# Patient Record
Sex: FEMALE
[De-identification: names, ages, dates, MRNs, and addresses within clinical notes are randomized per-mention and may not be internally consistent; named-entity substitution may affect disease eponyms.]

---

## 2021-12-03 ENCOUNTER — DASHBOARD ENCOUNTERS (OUTPATIENT)
Age: 56
End: 2021-12-03

## 2021-12-03 ENCOUNTER — LAB OUTSIDE AN ENCOUNTER (OUTPATIENT)
Dept: URBAN - METROPOLITAN AREA CLINIC 113 | Facility: CLINIC | Age: 56
End: 2021-12-03

## 2021-12-03 ENCOUNTER — WEB ENCOUNTER (OUTPATIENT)
Dept: URBAN - METROPOLITAN AREA CLINIC 113 | Facility: CLINIC | Age: 56
End: 2021-12-03

## 2021-12-03 ENCOUNTER — OFFICE VISIT (OUTPATIENT)
Dept: URBAN - METROPOLITAN AREA CLINIC 113 | Facility: CLINIC | Age: 56
End: 2021-12-03
Payer: SELF-PAY

## 2021-12-03 VITALS
WEIGHT: 166 LBS | HEIGHT: 66 IN | HEART RATE: 79 BPM | BODY MASS INDEX: 26.68 KG/M2 | RESPIRATION RATE: 20 BRPM | DIASTOLIC BLOOD PRESSURE: 89 MMHG | SYSTOLIC BLOOD PRESSURE: 127 MMHG | TEMPERATURE: 97.2 F

## 2021-12-03 DIAGNOSIS — R93.5 ABNORMAL CT OF THE ABDOMEN: ICD-10-CM

## 2021-12-03 DIAGNOSIS — R16.0 LIVER MASS: ICD-10-CM

## 2021-12-03 DIAGNOSIS — R10.13 EPIGASTRIC PAIN: ICD-10-CM

## 2021-12-03 DIAGNOSIS — R11.0 NAUSEA: ICD-10-CM

## 2021-12-03 DIAGNOSIS — R79.89 ELEVATED LFTS: ICD-10-CM

## 2021-12-03 DIAGNOSIS — R63.4 WEIGHT LOSS: ICD-10-CM

## 2021-12-03 DIAGNOSIS — Z12.11 COLON CANCER SCREENING: ICD-10-CM

## 2021-12-03 PROCEDURE — 99204 OFFICE O/P NEW MOD 45 MIN: CPT | Performed by: NURSE PRACTITIONER

## 2021-12-03 RX ORDER — INFLUENZA VIRUS VACCINE 15; 15; 15; 15 UG/.5ML; UG/.5ML; UG/.5ML; UG/.5ML
AS DIRECTED SUSPENSION INTRAMUSCULAR ONCE DAILY
Status: ACTIVE | COMMUNITY

## 2021-12-03 RX ORDER — UBIDECARENONE 30 MG
AS DIRECTED CAPSULE ORAL
Status: ACTIVE | COMMUNITY

## 2021-12-03 RX ORDER — AMLODIPINE BESYLATE 10 MG/1
1 TABLET TABLET ORAL ONCE A DAY
Status: ACTIVE | COMMUNITY

## 2021-12-03 RX ORDER — POLYETHYLENE GLYCOL 3350, SODIUM CHLORIDE, SODIUM BICARBONATE, POTASSIUM CHLORIDE 420; 11.2; 5.72; 1.48 G/4L; G/4L; G/4L; G/4L
420 G POWDER, FOR SOLUTION ORAL ONCE
Qty: 420 G | Refills: 0 | OUTPATIENT
Start: 2021-12-03 | End: 2021-12-04

## 2021-12-03 RX ORDER — ATORVASTATIN CALCIUM 40 MG/1
1 TABLET TABLET, FILM COATED ORAL ONCE A DAY
Status: ACTIVE | COMMUNITY

## 2021-12-03 RX ORDER — OMEPRAZOLE 20 MG/1
1 CAPSULE 30 MINUTES BEFORE MORNING MEAL CAPSULE, DELAYED RELEASE ORAL ONCE A DAY
Status: ACTIVE | COMMUNITY

## 2021-12-03 NOTE — HPI-TODAY'S VISIT:
55 yo woman presenting  evaluation of abdominal pain, weight loss, and liver masses noted on a CT a/p performed in the ED.  She is a terrible historian. She reports a history of liver masses, diagnosed a year ago. She provides some ED discharge paperwork from [facility] [place] that shows a contrasted CT scan was performed, showing several hemangiomas and liver cysts, as well as 2 indeterminate lesions in segment 4A measuring 25 and 35 mm which are more prominent than on the prior study but may represent areas focal fatty sparing in the background of hepatic steatosis. She also provides ED records from [facility] where labs show a normal CBC, AST 65, ALT 49, , and Tbili 0.7. She is very concerned about her liver. She tells me that her father  from "stomach cancer", as well as multiple aunts and uncles. She is unsure if this was truly stomach cancer because when asked, she also admits that it may have been a colon cancer.  She sometimes has trouble with swallowing characterized as a sensation of "something clogging her throat." This is intermittent with swallowing. She takes over the counter Pepcid with some relief. There is a history of heartburn type symptom, but not in a good while. There is sometimes nausea and vomiting. Nausea is usually associated with dizziness and becoming overheated. She admits increased fatigue. She has bowel movements twice per week, may be three times per week. There is no blood per rectum. She is losing weight. She states she has lost about 50 pounds over the last 6 months. This has been unintentional. She has early satiety.  She has never had an EGD or colonoscopy.

## 2021-12-07 ENCOUNTER — TELEPHONE ENCOUNTER (OUTPATIENT)
Dept: URBAN - METROPOLITAN AREA CLINIC 113 | Facility: CLINIC | Age: 56
End: 2021-12-07

## 2022-02-03 ENCOUNTER — OFFICE VISIT (OUTPATIENT)
Dept: URBAN - METROPOLITAN AREA CLINIC 113 | Facility: CLINIC | Age: 57
End: 2022-02-03